# Patient Record
Sex: FEMALE | Race: ASIAN | NOT HISPANIC OR LATINO
[De-identification: names, ages, dates, MRNs, and addresses within clinical notes are randomized per-mention and may not be internally consistent; named-entity substitution may affect disease eponyms.]

---

## 2022-06-14 PROBLEM — Z00.00 ENCOUNTER FOR PREVENTIVE HEALTH EXAMINATION: Status: ACTIVE | Noted: 2022-06-14

## 2022-06-23 ENCOUNTER — NON-APPOINTMENT (OUTPATIENT)
Age: 67
End: 2022-06-23

## 2022-06-23 ENCOUNTER — APPOINTMENT (OUTPATIENT)
Dept: ORTHOPEDIC SURGERY | Facility: CLINIC | Age: 67
End: 2022-06-23
Payer: MEDICARE

## 2022-06-23 VITALS
WEIGHT: 129 LBS | BODY MASS INDEX: 24.35 KG/M2 | HEART RATE: 66 BPM | SYSTOLIC BLOOD PRESSURE: 171 MMHG | DIASTOLIC BLOOD PRESSURE: 88 MMHG | HEIGHT: 61 IN

## 2022-06-23 DIAGNOSIS — M65.332 TRIGGER FINGER, LEFT MIDDLE FINGER: ICD-10-CM

## 2022-06-23 DIAGNOSIS — M24.541 CONTRACTURE, RIGHT HAND: ICD-10-CM

## 2022-06-23 DIAGNOSIS — Z78.9 OTHER SPECIFIED HEALTH STATUS: ICD-10-CM

## 2022-06-23 PROCEDURE — 20550 NJX 1 TENDON SHEATH/LIGAMENT: CPT | Mod: F2

## 2022-06-23 PROCEDURE — 99203 OFFICE O/P NEW LOW 30 MIN: CPT | Mod: 25

## 2022-06-23 NOTE — PROCEDURE
[] : The injection was done in 2 phases with the first phase being subcutaneous injection of lidocaine 1.5 cc subcutaneously as anesthetic. When adequate level of anesthesia was achieved, the Celestone injection was undertaken. [3rd] : 3rd finger

## 2022-06-25 PROBLEM — M24.541 FLEXION CONTRACTURE OF JOINT OF HAND, RIGHT: Status: ACTIVE | Noted: 2022-06-25

## 2022-06-25 PROBLEM — Z78.9 NON-SMOKER: Status: ACTIVE | Noted: 2022-06-23

## 2022-06-25 PROBLEM — M65.332 TRIGGER MIDDLE FINGER OF LEFT HAND: Status: ACTIVE | Noted: 2022-06-25

## 2022-06-25 RX ORDER — CHOLECALCIFEROL (VITAMIN D3) 1250 MCG
1.25 MG CAPSULE ORAL
Qty: 5 | Refills: 0 | Status: ACTIVE | COMMUNITY
Start: 2021-10-18

## 2022-06-25 RX ORDER — METOPROLOL SUCCINATE 25 MG/1
25 TABLET, EXTENDED RELEASE ORAL
Qty: 180 | Refills: 0 | Status: ACTIVE | COMMUNITY
Start: 2022-04-29

## 2022-06-25 RX ORDER — ALENDRONATE SODIUM 70 MG/1
70 TABLET ORAL
Qty: 4 | Refills: 0 | Status: ACTIVE | COMMUNITY
Start: 2022-06-19

## 2022-06-25 RX ORDER — IBUPROFEN 600 MG/1
600 TABLET, FILM COATED ORAL
Qty: 120 | Refills: 0 | Status: ACTIVE | COMMUNITY
Start: 2022-03-14

## 2022-06-25 RX ORDER — AMOXICILLIN 875 MG/1
875 TABLET, FILM COATED ORAL
Qty: 14 | Refills: 0 | Status: COMPLETED | COMMUNITY
Start: 2022-04-15

## 2022-06-25 NOTE — DISCUSSION/SUMMARY
[FreeTextEntry1] : Patient has trigger finger of left long finger 2-3 weeks duration which is quite painful especially when it locks.\par I have reviewed treatment options, risk, complications with patient.  Patient accepted and was treated with 2 phase cortisone injection which was well-tolerated without complication.\par The statistical chances of resolution versus recurrence, and the statistics regarding the possibility of additional injections  were discussed with the patient.\par The following post-injection instructions were given to the patient: The patient should be cautious in activities for two weeks and then increase to full activities.  Thereafter, the patient should return if symptoms continue, or if they christopher and recur subsequently. If symptoms do not recur, the patient need not return and can be seen on an as needed basis. The patient has expressed understanding and acceptance of analysis, treatment, and recommendations.  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  Patient states that the flexion contractures developed shortly after the Colles' fracture and have not changed.  Patient has no pain associated and has adapted to the contractures and is functioning satisfactorily.  Weeks of the duration and nature of the contractures it is extremely unlikely that any intervention would make them measurably better.

## 2022-06-25 NOTE — PHYSICAL EXAM
[de-identified] : Left hand\par Long finger A1 pulley is tender\par Patient has pain attempting to make a composite fist\par Long finger locks and triggers into extension with pain\par There is no other A1 pulley tenderness or triggering in any other finger, right hand.\par Heberden's node little finger only\par \par Right wrist\par Modest Colles' deformity prominent ulna\par Flexion contracture PIP joints index to little increasing\par PIP 2: 20 degrees, PIP 3: 30 degrees PIP 4: 50 degrees PIP 5: 75 degrees\par Full active fist without triggering\par No pertinent MP, PIP, or DIP joint contributory findings, except some Heberden's nodes; none are clinically painful.\par \par Neurologic: Median, ulnar, and radial motor and sensory are intact. \par Skin: No cyanosis, clubbing, or rashes.\par Vascular: Radial pulses intact.\par Lymphatic: No streaking or epitrochlear adenopathy.\par The patient is awake, alert, and oriented. Affect appropriate. Cooperative.

## 2022-06-25 NOTE — HISTORY OF PRESENT ILLNESS
[FreeTextEntry1] : Patient is 60 yo LHD female who presents for hand evaluation.\par \par Of note [facility] indicates that the patient sustained a right wrist Colles' fracture January 2015 and was treated at [facility] with reduction and splinting.\par There are no other notes of orthopedic care available in [facility].\par \par TODAY:\par Patient has left hand pain approximately 2 to 3-week duration.\par She describes triggering of left long finger.  Patient has marked A1 pulley tenderness\par Patient describes painful triggering.\par No other left hand complaints.\par \par Patient had right wrist fracture 2015.\par Patient was treated by a physician in [place].\par Contracture all PIP joints.  Most severe little finger, 60 degrees, and less severe moving radially with least severe index finger 20 degrees.\par Patient not consciously aware of triggering at any time.\par